# Patient Record
Sex: FEMALE | Race: WHITE | NOT HISPANIC OR LATINO | Employment: FULL TIME | URBAN - NONMETROPOLITAN AREA
[De-identification: names, ages, dates, MRNs, and addresses within clinical notes are randomized per-mention and may not be internally consistent; named-entity substitution may affect disease eponyms.]

---

## 2024-01-08 ENCOUNTER — HOSPITAL ENCOUNTER (EMERGENCY)
Facility: HOSPITAL | Age: 39
Discharge: HOME | End: 2024-01-08
Attending: EMERGENCY MEDICINE | Admitting: EMERGENCY MEDICINE
Payer: COMMERCIAL

## 2024-01-08 ENCOUNTER — APPOINTMENT (OUTPATIENT)
Dept: GENERAL RADIOLOGY | Facility: HOSPITAL | Age: 39
End: 2024-01-08
Payer: COMMERCIAL

## 2024-01-08 VITALS
OXYGEN SATURATION: 97 % | HEART RATE: 70 BPM | TEMPERATURE: 98.4 F | RESPIRATION RATE: 16 BRPM | DIASTOLIC BLOOD PRESSURE: 70 MMHG | SYSTOLIC BLOOD PRESSURE: 113 MMHG | HEIGHT: 62 IN | BODY MASS INDEX: 30.36 KG/M2 | WEIGHT: 165 LBS

## 2024-01-08 DIAGNOSIS — J02.9 SORE THROAT: ICD-10-CM

## 2024-01-08 DIAGNOSIS — N25.89 RTA (RENAL TUBULAR ACIDOSIS): ICD-10-CM

## 2024-01-08 DIAGNOSIS — B34.9 VIRAL ILLNESS: Primary | ICD-10-CM

## 2024-01-08 LAB
ANION GAP SERPL CALCULATED.3IONS-SCNC: 12 MMOL/L (ref 5–15)
BASOPHILS # BLD AUTO: 0.03 10*3/MM3 (ref 0–0.2)
BASOPHILS NFR BLD AUTO: 0.3 % (ref 0–1.5)
BUN SERPL-MCNC: 12 MG/DL (ref 6–20)
BUN/CREAT SERPL: 16.9 (ref 7–25)
CALCIUM SPEC-SCNC: 8.5 MG/DL (ref 8.6–10.5)
CHLORIDE SERPL-SCNC: 107 MMOL/L (ref 98–107)
CO2 SERPL-SCNC: 21 MMOL/L (ref 22–29)
CREAT SERPL-MCNC: 0.71 MG/DL (ref 0.57–1)
DEPRECATED RDW RBC AUTO: 41.9 FL (ref 37–54)
EGFRCR SERPLBLD CKD-EPI 2021: 111.1 ML/MIN/1.73
EOSINOPHIL # BLD AUTO: 0.25 10*3/MM3 (ref 0–0.4)
EOSINOPHIL NFR BLD AUTO: 2.4 % (ref 0.3–6.2)
ERYTHROCYTE [DISTWIDTH] IN BLOOD BY AUTOMATED COUNT: 12.5 % (ref 12.3–15.4)
FLUAV RNA RESP QL NAA+PROBE: NOT DETECTED
FLUBV RNA RESP QL NAA+PROBE: NOT DETECTED
GLUCOSE SERPL-MCNC: 103 MG/DL (ref 65–99)
HCT VFR BLD AUTO: 40.2 % (ref 34–46.6)
HGB BLD-MCNC: 13.2 G/DL (ref 12–15.9)
IMM GRANULOCYTES # BLD AUTO: 0.05 10*3/MM3 (ref 0–0.05)
IMM GRANULOCYTES NFR BLD AUTO: 0.5 % (ref 0–0.5)
LYMPHOCYTES # BLD AUTO: 2.54 10*3/MM3 (ref 0.7–3.1)
LYMPHOCYTES NFR BLD AUTO: 24.2 % (ref 19.6–45.3)
MCH RBC QN AUTO: 30.1 PG (ref 26.6–33)
MCHC RBC AUTO-ENTMCNC: 32.8 G/DL (ref 31.5–35.7)
MCV RBC AUTO: 91.8 FL (ref 79–97)
MONOCYTES # BLD AUTO: 0.5 10*3/MM3 (ref 0.1–0.9)
MONOCYTES NFR BLD AUTO: 4.8 % (ref 5–12)
NEUTROPHILS NFR BLD AUTO: 67.8 % (ref 42.7–76)
NEUTROPHILS NFR BLD AUTO: 7.13 10*3/MM3 (ref 1.7–7)
NRBC BLD AUTO-RTO: 0 /100 WBC (ref 0–0.2)
PLATELET # BLD AUTO: 223 10*3/MM3 (ref 140–450)
PMV BLD AUTO: 10.4 FL (ref 6–12)
POTASSIUM SERPL-SCNC: 4.1 MMOL/L (ref 3.5–5.2)
RBC # BLD AUTO: 4.38 10*6/MM3 (ref 3.77–5.28)
S PYO AG THROAT QL: NEGATIVE
SARS-COV-2 RNA RESP QL NAA+PROBE: NOT DETECTED
SODIUM SERPL-SCNC: 140 MMOL/L (ref 136–145)
WBC NRBC COR # BLD AUTO: 10.5 10*3/MM3 (ref 3.4–10.8)

## 2024-01-08 PROCEDURE — 96374 THER/PROPH/DIAG INJ IV PUSH: CPT

## 2024-01-08 PROCEDURE — 85025 COMPLETE CBC W/AUTO DIFF WBC: CPT | Performed by: EMERGENCY MEDICINE

## 2024-01-08 PROCEDURE — 71045 X-RAY EXAM CHEST 1 VIEW: CPT

## 2024-01-08 PROCEDURE — 25810000003 SODIUM CHLORIDE 0.9 % SOLUTION: Performed by: EMERGENCY MEDICINE

## 2024-01-08 PROCEDURE — 25010000002 ONDANSETRON PER 1 MG: Performed by: EMERGENCY MEDICINE

## 2024-01-08 PROCEDURE — 87636 SARSCOV2 & INF A&B AMP PRB: CPT | Performed by: EMERGENCY MEDICINE

## 2024-01-08 PROCEDURE — 99283 EMERGENCY DEPT VISIT LOW MDM: CPT

## 2024-01-08 PROCEDURE — 80048 BASIC METABOLIC PNL TOTAL CA: CPT | Performed by: EMERGENCY MEDICINE

## 2024-01-08 PROCEDURE — 87880 STREP A ASSAY W/OPTIC: CPT | Performed by: EMERGENCY MEDICINE

## 2024-01-08 RX ORDER — ONDANSETRON 2 MG/ML
4 INJECTION INTRAMUSCULAR; INTRAVENOUS ONCE
Status: COMPLETED | OUTPATIENT
Start: 2024-01-08 | End: 2024-01-08

## 2024-01-08 RX ORDER — ACETAMINOPHEN 500 MG
1000 TABLET ORAL ONCE
Status: COMPLETED | OUTPATIENT
Start: 2024-01-08 | End: 2024-01-08

## 2024-01-08 RX ADMIN — SODIUM CHLORIDE 1000 ML: 9 INJECTION, SOLUTION INTRAVENOUS at 08:42

## 2024-01-08 RX ADMIN — ACETAMINOPHEN 1000 MG: 500 TABLET, FILM COATED ORAL at 09:18

## 2024-01-08 RX ADMIN — ONDANSETRON 4 MG: 2 INJECTION INTRAMUSCULAR; INTRAVENOUS at 08:43

## 2024-01-08 NOTE — DISCHARGE INSTRUCTIONS
Follow up with one of the [facility] physician groups below to setup primary care. If you have trouble making an appointment, please call the [facility] Nurse Line at [phone]    [facility] [place]  [address] [place] [address], [place], [place]  [id number] [phone]    [facility] - [address], [place], [place] [address] [phone]    [facility] - [address], [address], [place], [place] [address] [phone]     [facility] ([facility]) - [place] [address] [facility], [address], [place] [address] [phone]    [facility] - [address], [address] [phone]    [facility] - [address], [place] [address] [phone]    [facility] - [place]  [address], [place], [address] [phone]    [facility] - [place]  [address], [address] [phone]    [facility] - [address] [address] [phone]    [facility] - [place]  [address] [place] [address], [place] [address], [address] [phone]        PEDIATRIC:    [facility] - [place] [address], [address], [place] [address] [phone]    Patient was not given antibiotics.  This a viral illness no evidence of any bacterial infection.  Colds, flu, and many other upper respiratory infections such as bronchitis and rhinosinusitis are usually caused by viruses.  Antibiotics do not kill viruses. Viral infections are almost always cured by your body’s own immune  system.  Based on your history and physical examination, it is likely that your illness is caused by a virus. Your illness is unlikely to be helped by an antibiotic. Antibiotics do not shorten the length of time that you will feel sick from a virus. Antibiotics do not prevent you from spreading an illness caused by a virus. Patients given antibiotics may start to feel better, but this is because the virus infection is resolving on its own and not because of the antibiotic.  In addition, taking antibiotics can come with certain risks:  Almost one in every four persons taking antibiotics experiences side effects (for example, dizziness, stomach problems, rash, diarrhea, vaginal yeast infection).   Rarely, people can have severe allergic reactions to antibiotics.   Using antibiotics when they aren’t needed can lead to the antibiotics not working against other infections in the future, also known as antibiotic resistance.   Taking antibiotics can increase the risk for Clostridium difficile infection, a form of diarrhea that requires additional treatment with antibiotics and even stool transplant in severe cases.  At this time, we recommend that you focus on treating your symptoms. Rest, stay hydrated, and take over-the-counter medications for symptom relief. You can prevent the spread of the virus to others by washing your hands frequently, wearing a mask, coughing into your sleeve, and staying away from others while you are sick.  Seek re-evaluation by your healthcare provider if you experience any of the following:  You don’t get better after a week   You get better, but then get worse again   You have a high fever (greater than 101.5 degrees Fahrenheit)

## 2024-01-08 NOTE — ED PROVIDER NOTES
Subjective   History of Present Illness  The patient is 38 years old who is child influenza and strep she is vomiting coughing and having sore throat therefore came to the ER for evaluation has vomited multiple times that she says she does not appear toxic does not appear sick.    Flu Symptoms  Presenting symptoms: cough, fatigue, nausea, rhinorrhea, sore throat and vomiting    Presenting symptoms: no headaches, no myalgias and no shortness of breath    Severity:  Moderate  Onset quality:  Gradual  Chronicity:  New  Relieved by:  Nothing  Worsened by:  Nothing  Ineffective treatments:  None tried  Associated symptoms: no chills, no decreased appetite, no decrease in physical activity, no ear pain, no mental status change, no congestion and no neck stiffness    Risk factors: not elderly, no diabetes problem, no heart disease, no kidney disease and no liver disease        Review of Systems   Constitutional: Negative.  Positive for fatigue. Negative for chills and decreased appetite.   HENT: Negative.  Positive for rhinorrhea and sore throat. Negative for congestion and ear pain.    Eyes: Negative.    Respiratory: Negative.  Positive for cough. Negative for shortness of breath.    Cardiovascular: Negative.    Gastrointestinal:  Positive for nausea and vomiting.   Endocrine: Negative.    Genitourinary: Negative.    Musculoskeletal:  Negative for myalgias and neck stiffness.   Skin: Negative.    Neurological: Negative.  Negative for headaches.   Hematological: Negative.    All other systems reviewed and are negative.      No past medical history on file.    Allergies   Allergen Reactions    Olive Anaphylaxis    Penicillins Anaphylaxis    Prednisone Anaphylaxis    Rocephin [Ceftriaxone] Hives       No past surgical history on file.    No family history on file.    Social History     Socioeconomic History    Marital status:            Objective   Physical Exam  Vitals and nursing note reviewed. Exam conducted with  a chaperone present.   Constitutional:       General: She is awake.      Appearance: Normal appearance. She is well-developed. She is not ill-appearing.   HENT:      Head: Normocephalic and atraumatic.      Mouth/Throat:      Mouth: Mucous membranes are moist.      Pharynx: Oropharynx is clear. No oropharyngeal exudate or posterior oropharyngeal erythema.   Eyes:      General: Lids are normal.      Conjunctiva/sclera: Conjunctivae normal.      Pupils: Pupils are equal, round, and reactive to light.   Cardiovascular:      Rate and Rhythm: Normal rate and regular rhythm.      Chest Wall: PMI is not displaced.      Pulses: Normal pulses.      Heart sounds: Normal heart sounds.   Pulmonary:      Effort: Pulmonary effort is normal. No respiratory distress.      Breath sounds: Normal breath sounds. No stridor. No decreased breath sounds, wheezing or rhonchi.   Abdominal:      General: Abdomen is flat. Bowel sounds are normal.      Palpations: Abdomen is soft.      Tenderness: There is no abdominal tenderness.   Musculoskeletal:         General: Normal range of motion.      Cervical back: Full passive range of motion without pain, normal range of motion and neck supple. No rigidity or tenderness.   Skin:     General: Skin is warm and dry.      Capillary Refill: Capillary refill takes less than 2 seconds.   Neurological:      General: No focal deficit present.      Mental Status: She is alert and oriented to person, place, and time. Mental status is at baseline.      Cranial Nerves: No cranial nerve deficit.      Motor: Motor function is intact.      Deep Tendon Reflexes: Reflexes are normal and symmetric.   Psychiatric:         Mood and Affect: Mood normal.         Behavior: Behavior is cooperative.         Procedures           ED Course  ED Course as of 01/08/24 1015   Mon Jan 08, 2024   1011 Patient has a mild Rta lab workup is negative.  Strep screen is negative.  I discussed this case at length with the patient will  discharge the patient home. [TS]      ED Course User Index  [TS] Sam Ortiz MD                                             Medical Decision Making  Patient with symptoms of upper respite tract infection with sore throat cough and congestion will get some lab workup IV fluids and see with final for her.    Problems Addressed:  RTA (renal tubular acidosis): undiagnosed new problem with uncertain prognosis     Details: Will require follow-up with the primary MD for reevaluation reassessment for this.  Sore throat: complicated acute illness or injury     Details: Throat probably viral illness.  Viral illness: complicated acute illness or injury    Amount and/or Complexity of Data Reviewed  Labs: ordered.     Details: Labs reviewed  Radiology: ordered.    Risk  OTC drugs.  Prescription drug management.  Risk Details: Patient is awake alert oriented x 3 no clinical evidence of sepsis.  No rashes.  No nuchal rigidity no chest pain or shortness of breath no abdominal pain.  Flulike symptoms probably viral illness.  .  Patient has been advised return there for any worsening symptoms        Final diagnoses:   Viral illness   RTA (renal tubular acidosis)   Sore throat       ED Disposition  ED Disposition       ED Disposition   Discharge    Condition   Stable    Comment   --               No follow-up provider specified.       Medication List      No changes were made to your prescriptions during this visit.            Sam Ortiz MD  01/08/24 0749       Sam Ortiz MD  01/08/24 1014       Sam Ortiz MD  01/08/24 1015

## 2024-01-08 NOTE — Clinical Note
[facility] EMERGENCY DEPARTMENT  [address]  [facility] [address]  Phone: [phone]    Pilar Lord was seen and treated in our emergency department on 1/8/2024.  She may return to work on 01/10/2024.         Thank you for choosing [facility].    Sam Ortiz MD

## 2024-01-10 LAB — BACTERIA SPEC AEROBE CULT: NORMAL
